# Patient Record
Sex: MALE | Race: WHITE | NOT HISPANIC OR LATINO | URBAN - NONMETROPOLITAN AREA
[De-identification: names, ages, dates, MRNs, and addresses within clinical notes are randomized per-mention and may not be internally consistent; named-entity substitution may affect disease eponyms.]

---

## 2023-08-25 ENCOUNTER — OFFICE VISIT (OUTPATIENT)
Dept: URBAN - NONMETROPOLITAN AREA CLINIC 2 | Facility: CLINIC | Age: 63
End: 2023-08-25

## 2023-08-25 RX ORDER — QUETIAPINE FUMARATE 25 MG/1
TABLET ORAL
Qty: 30 TABLET | Status: ACTIVE | COMMUNITY

## 2023-08-25 RX ORDER — LISINOPRIL 30 MG/1
TABLET ORAL
Qty: 100 TABLET | Status: ACTIVE | COMMUNITY

## 2023-08-25 RX ORDER — ALPRAZOLAM 0.25 MG/1
TABLET ORAL
Qty: 12 TABLET | Status: ACTIVE | COMMUNITY

## 2023-08-25 RX ORDER — ACETAMINOPHEN AND CODEINE PHOSPHATE 300; 60 MG/1; MG/1
TABLET ORAL
Qty: 60 TABLET | Status: ACTIVE | COMMUNITY

## 2023-08-25 RX ORDER — MESALAMINE 1.2 G/1
TABLET, DELAYED RELEASE ORAL
Qty: 0 | Refills: 0 | Status: ACTIVE | COMMUNITY
Start: 1900-01-01

## 2023-08-25 RX ORDER — MELOXICAM 15 MG/1
TABLET ORAL
Qty: 30 TABLET | Status: ACTIVE | COMMUNITY

## 2023-08-25 RX ORDER — PANTOPRAZOLE SODIUM 40 MG
TABLET, DELAYED RELEASE (ENTERIC COATED) ORAL
Qty: 0 | Refills: 0 | Status: ACTIVE | COMMUNITY
Start: 1900-01-01

## 2023-10-04 ENCOUNTER — OFFICE VISIT (OUTPATIENT)
Dept: URBAN - NONMETROPOLITAN AREA CLINIC 2 | Facility: CLINIC | Age: 63
End: 2023-10-04

## 2024-06-12 ENCOUNTER — OFFICE VISIT (OUTPATIENT)
Dept: URBAN - NONMETROPOLITAN AREA CLINIC 2 | Facility: CLINIC | Age: 64
End: 2024-06-12

## 2024-06-12 PROBLEM — 41364008: Status: ACTIVE | Noted: 2024-06-12

## 2024-06-12 PROBLEM — 1082771000119100: Status: ACTIVE | Noted: 2024-06-12

## 2024-06-12 RX ORDER — LISINOPRIL 30 MG/1
TABLET ORAL
Qty: 100 TABLET | Status: ACTIVE | COMMUNITY

## 2024-06-12 RX ORDER — QUETIAPINE FUMARATE 25 MG/1
TABLET ORAL
Qty: 30 TABLET | Status: ACTIVE | COMMUNITY

## 2024-06-12 RX ORDER — PANTOPRAZOLE SODIUM 40 MG
TABLET, DELAYED RELEASE (ENTERIC COATED) ORAL
Qty: 0 | Refills: 0 | Status: ACTIVE | COMMUNITY
Start: 1900-01-01

## 2024-06-12 RX ORDER — ALPRAZOLAM 0.25 MG/1
TABLET ORAL
Qty: 12 TABLET | Status: ACTIVE | COMMUNITY

## 2024-06-12 RX ORDER — MELOXICAM 15 MG/1
TABLET ORAL
Qty: 30 TABLET | Status: ACTIVE | COMMUNITY

## 2024-06-12 RX ORDER — ACETAMINOPHEN AND CODEINE PHOSPHATE 300; 60 MG/1; MG/1
TABLET ORAL
Qty: 60 TABLET | Status: ACTIVE | COMMUNITY

## 2024-06-12 RX ORDER — MESALAMINE 1.2 G/1
TABLET, DELAYED RELEASE ORAL
Qty: 0 | Refills: 0 | Status: ACTIVE | COMMUNITY
Start: 1900-01-01

## 2024-06-12 NOTE — HPI-TODAY'S VISIT:
63 y/o M with history of left sided ulcerative colitis and Bagley's esophagus with low grade dysplasia presents to re-establish care.  He last had EGD/colonoscopy with our group 10/2019 with long segment Bagley's and active proctosigmoiditis.  He had a Bagley's ablation a month later with Dr. Harrington after his biopsies showed low grade dysplasia.  Today he reports that

## 2024-06-20 ENCOUNTER — OFFICE VISIT (OUTPATIENT)
Dept: URBAN - NONMETROPOLITAN AREA CLINIC 2 | Facility: CLINIC | Age: 64
End: 2024-06-20

## 2024-06-20 RX ORDER — ALPRAZOLAM 0.25 MG/1
TABLET ORAL
Qty: 12 TABLET | Status: ACTIVE | COMMUNITY

## 2024-06-20 RX ORDER — MELOXICAM 15 MG/1
TABLET ORAL
Qty: 30 TABLET | Status: ACTIVE | COMMUNITY

## 2024-06-20 RX ORDER — MESALAMINE 1.2 G/1
TABLET, DELAYED RELEASE ORAL
Qty: 0 | Refills: 0 | Status: ACTIVE | COMMUNITY
Start: 1900-01-01

## 2024-06-20 RX ORDER — LISINOPRIL 30 MG/1
TABLET ORAL
Qty: 100 TABLET | Status: ACTIVE | COMMUNITY

## 2024-06-20 RX ORDER — PANTOPRAZOLE SODIUM 40 MG
TABLET, DELAYED RELEASE (ENTERIC COATED) ORAL
Qty: 0 | Refills: 0 | Status: ACTIVE | COMMUNITY
Start: 1900-01-01

## 2024-06-20 RX ORDER — QUETIAPINE FUMARATE 25 MG/1
TABLET ORAL
Qty: 30 TABLET | Status: ACTIVE | COMMUNITY

## 2024-06-20 RX ORDER — ACETAMINOPHEN AND CODEINE PHOSPHATE 300; 60 MG/1; MG/1
TABLET ORAL
Qty: 60 TABLET | Status: ACTIVE | COMMUNITY

## 2024-06-25 ENCOUNTER — LAB OUTSIDE AN ENCOUNTER (OUTPATIENT)
Dept: URBAN - NONMETROPOLITAN AREA CLINIC 2 | Facility: CLINIC | Age: 64
End: 2024-06-25

## 2024-06-25 ENCOUNTER — OFFICE VISIT (OUTPATIENT)
Dept: URBAN - NONMETROPOLITAN AREA CLINIC 2 | Facility: CLINIC | Age: 64
End: 2024-06-25
Payer: MEDICARE

## 2024-06-25 ENCOUNTER — DASHBOARD ENCOUNTERS (OUTPATIENT)
Age: 64
End: 2024-06-25

## 2024-06-25 VITALS
SYSTOLIC BLOOD PRESSURE: 126 MMHG | HEART RATE: 92 BPM | DIASTOLIC BLOOD PRESSURE: 92 MMHG | WEIGHT: 221 LBS | BODY MASS INDEX: 31.64 KG/M2 | TEMPERATURE: 92 F | HEIGHT: 70 IN

## 2024-06-25 DIAGNOSIS — R13.19 CERVICAL DYSPHAGIA: ICD-10-CM

## 2024-06-25 DIAGNOSIS — K51.80 CHRONIC PANCOLONIC ULCERATIVE COLITIS: ICD-10-CM

## 2024-06-25 DIAGNOSIS — K22.710 BARRETT'S ESOPHAGUS WITH LOW GRADE DYSPLASIA: ICD-10-CM

## 2024-06-25 DIAGNOSIS — R10.32 LLQ PAIN: ICD-10-CM

## 2024-06-25 PROBLEM — 14760008: Status: ACTIVE | Noted: 2024-06-25

## 2024-06-25 PROCEDURE — 99204 OFFICE O/P NEW MOD 45 MIN: CPT

## 2024-06-25 RX ORDER — PANTOPRAZOLE SODIUM 40 MG
TABLET, DELAYED RELEASE (ENTERIC COATED) ORAL
Qty: 0 | Refills: 0 | Status: ACTIVE | COMMUNITY
Start: 1900-01-01

## 2024-06-25 RX ORDER — MESALAMINE 1.2 G/1
TABLET, DELAYED RELEASE ORAL
Qty: 0 | Refills: 0 | Status: ACTIVE | COMMUNITY
Start: 1900-01-01

## 2024-06-25 RX ORDER — HYDROCORTISONE ACETATE 25 MG/1
1 SUPPOSITORY SUPPOSITORY RECTAL ONCE A DAY
Qty: 30 UNSPECIFIED | Refills: 3 | OUTPATIENT
Start: 2024-06-25 | End: 2024-10-23

## 2024-06-25 RX ORDER — ALPRAZOLAM 0.25 MG/1
TABLET ORAL
Qty: 12 TABLET | Status: ACTIVE | COMMUNITY

## 2024-06-25 RX ORDER — MELOXICAM 15 MG/1
TABLET ORAL
Qty: 30 TABLET | Status: ACTIVE | COMMUNITY

## 2024-06-25 RX ORDER — ACETAMINOPHEN AND CODEINE PHOSPHATE 300; 60 MG/1; MG/1
TABLET ORAL
Qty: 60 TABLET | Status: ACTIVE | COMMUNITY

## 2024-06-25 RX ORDER — LISINOPRIL 30 MG/1
TABLET ORAL
Qty: 100 TABLET | Status: ACTIVE | COMMUNITY

## 2024-06-25 RX ORDER — QUETIAPINE FUMARATE 25 MG/1
TABLET ORAL
Qty: 30 TABLET | Status: ACTIVE | COMMUNITY

## 2024-06-25 RX ORDER — DICYCLOMINE HYDROCHLORIDE 20 MG/1
1 TABLET TABLET ORAL THREE TIMES A DAY
Qty: 270 TABLET | Refills: 3 | OUTPATIENT
Start: 2024-06-25 | End: 2025-06-20

## 2024-06-25 RX ORDER — PANTOPRAZOLE 40 MG/1
TAKE 1 TABLET BY MOUTH ONCE DAILY TABLET, DELAYED RELEASE ORAL
Qty: 90 EACH | Refills: 1 | Status: ACTIVE | COMMUNITY

## 2024-06-25 NOTE — HPI-TODAY'S VISIT:
65 y/o M with history of left sided ulcerative colitis and Bagley's esophagus with low grade dysplasia presents to re-establish care.  He last had EGD/colonoscopy with our group 10/2019 with long segment Bagley's and active proctosigmoiditis.  He had a Bagley's ablation a month later with Dr. Harrington after his biopsies showed low grade dysplasia.   6/25/2024 Domingo presents to clinic for evaluation of rectal bleeding lower abdominal pain and frequent bowel movements.  He also has a history of Bagley's esophagus with ablation in the past with Dr. Harrington. Today he complains of ongoing bloody stool which occurs every day for the first hour of the day after he wakes up.  He has been off Lialda he states this has been ongoing for years.  For some time.  He denies any reflux symptoms.  He continues on meloxicam and pantoprazole 40 mg once a day.  He no longer has sleep apnea states this resolved after significant weight loss.  He lost 40 pounds over the last year and a half unintentionally. He is having 6-10 trips to the bathroom per day left lower abdomen cramping discomfort and a sense of rectal pressure. He is also having some rectal leakage. This has been ongoing for years. He endorses some dysphagia due to difficulty chewing well  with his upper denture. He refuses to use Uceris rectal foam but will consider suppositories. He would like to schedule his repeat surveillance EGD and colonoscopy.  However today he is experiencing chest pain and is overdue for his follow-up with cardiology with a history of CAD with PCI.  He believes he is due for a 5-year stress test.  He has not yet established with a cardiologist in this area.  He will call [facility] to seek an appointment and we will send a clearance request.

## 2024-06-26 ENCOUNTER — TELEPHONE ENCOUNTER (OUTPATIENT)
Dept: URBAN - NONMETROPOLITAN AREA CLINIC 2 | Facility: CLINIC | Age: 64
End: 2024-06-26

## 2024-06-26 LAB
A/G RATIO: 1.5
ABSOLUTE BASOPHILS: 63
ABSOLUTE EOSINOPHILS: 7
ABSOLUTE LYMPHOCYTES: 1211
ABSOLUTE MONOCYTES: 518
ABSOLUTE NEUTROPHILS: 5201
ALBUMIN: 4.1
ALKALINE PHOSPHATASE: 83
ALT (SGPT): 6
AST (SGOT): 12
BASOPHILS: 0.9
BILIRUBIN, TOTAL: 0.4
BUN/CREATININE RATIO: (no result)
BUN: 19
C-REACTIVE PROTEIN, QUANT: <3
CALCIUM: 9.3
CARBON DIOXIDE, TOTAL: 27
CHLORIDE: 106
CREATININE: 1.34
EGFR: 58
EOSINOPHILS: 0.1
GLOBULIN, TOTAL: 2.8
GLUCOSE: 89
HEMATOCRIT: 34.5
HEMOGLOBIN: 10.2
LYMPHOCYTES: 17.3
MCH: 23.9
MCHC: 29.6
MCV: 80.8
MONOCYTES: 7.4
MPV: 9.1
NEUTROPHILS: 74.3
PLATELET COUNT: 267
POTASSIUM: 4.9
PROTEIN, TOTAL: 6.9
RDW: 15
RED BLOOD CELL COUNT: 4.27
SED RATE BY MODIFIED: 6
SODIUM: 140
WHITE BLOOD CELL COUNT: 7

## 2024-06-28 ENCOUNTER — OFFICE VISIT (OUTPATIENT)
Dept: URBAN - NONMETROPOLITAN AREA CLINIC 2 | Facility: CLINIC | Age: 64
End: 2024-06-28

## 2024-07-16 ENCOUNTER — TELEPHONE ENCOUNTER (OUTPATIENT)
Dept: URBAN - NONMETROPOLITAN AREA CLINIC 2 | Facility: CLINIC | Age: 64
End: 2024-07-16

## 2024-08-20 ENCOUNTER — CLAIMS CREATED FROM THE CLAIM WINDOW (OUTPATIENT)
Dept: URBAN - METROPOLITAN AREA CLINIC 4 | Facility: CLINIC | Age: 64
End: 2024-08-20
Payer: COMMERCIAL

## 2024-08-20 ENCOUNTER — CLAIMS CREATED FROM THE CLAIM WINDOW (OUTPATIENT)
Dept: URBAN - NONMETROPOLITAN AREA SURGERY CENTER 1 | Facility: SURGERY CENTER | Age: 64
End: 2024-08-20
Payer: COMMERCIAL

## 2024-08-20 ENCOUNTER — TELEPHONE ENCOUNTER (OUTPATIENT)
Dept: URBAN - NONMETROPOLITAN AREA CLINIC 2 | Facility: CLINIC | Age: 64
End: 2024-08-20

## 2024-08-20 DIAGNOSIS — K22.719 BARRETT'S ESOPHAGUS WITH DYSPLASIA, UNSPECIFIED: ICD-10-CM

## 2024-08-20 DIAGNOSIS — K51.919 ULCERATIVE COLITIS, UNSPECIFIED WITH UNSPECIFIED COMPLICATIONS: ICD-10-CM

## 2024-08-20 DIAGNOSIS — K51.30 ACUTE ULCERATIVE RECTOSIGMOIDITIS: ICD-10-CM

## 2024-08-20 DIAGNOSIS — K31.89 OTHER DISEASES OF STOMACH AND DUODENUM: ICD-10-CM

## 2024-08-20 DIAGNOSIS — K22.70 BARRETT'S ESOPHAGUS WITHOUT DYSPLASIA: ICD-10-CM

## 2024-08-20 DIAGNOSIS — K25.7 CHRONIC GASTRIC ULCER WITHOUT HEMORRHAGE OR PERFORATION: ICD-10-CM

## 2024-08-20 DIAGNOSIS — K22.70 BARRETT ESOPHAGUS: ICD-10-CM

## 2024-08-20 DIAGNOSIS — K44.9 HIATAL HERNIA: ICD-10-CM

## 2024-08-20 DIAGNOSIS — K25.9 EROSION OF STOMACH DETERMINED BY ENDOSCOPY: ICD-10-CM

## 2024-08-20 DIAGNOSIS — Z98.84 HISTORY OF ROUX-EN-Y GASTRIC BYPASS: ICD-10-CM

## 2024-08-20 DIAGNOSIS — K51.80 ULCERATIVE COLITIS: ICD-10-CM

## 2024-08-20 PROBLEM — 8493009: Status: ACTIVE | Noted: 2024-08-20

## 2024-08-20 PROCEDURE — 00813 ANES UPR LWR GI NDSC PX: CPT | Performed by: NURSE ANESTHETIST, CERTIFIED REGISTERED

## 2024-08-20 PROCEDURE — 88312 SPECIAL STAINS GROUP 1: CPT | Performed by: PATHOLOGY

## 2024-08-20 PROCEDURE — 45380 COLONOSCOPY AND BIOPSY: CPT | Performed by: INTERNAL MEDICINE

## 2024-08-20 PROCEDURE — 43239 EGD BIOPSY SINGLE/MULTIPLE: CPT | Performed by: INTERNAL MEDICINE

## 2024-08-20 PROCEDURE — 88305 TISSUE EXAM BY PATHOLOGIST: CPT | Performed by: PATHOLOGY

## 2024-08-20 RX ORDER — ALPRAZOLAM 0.25 MG/1
TABLET ORAL
Qty: 12 TABLET | Status: ACTIVE | COMMUNITY

## 2024-08-20 RX ORDER — PANTOPRAZOLE 40 MG/1
TAKE 1 TABLET BY MOUTH ONCE DAILY TABLET, DELAYED RELEASE ORAL
Qty: 90 EACH | Refills: 1 | Status: ACTIVE | COMMUNITY

## 2024-08-20 RX ORDER — DICYCLOMINE HYDROCHLORIDE 20 MG/1
1 TABLET TABLET ORAL THREE TIMES A DAY
Qty: 270 TABLET | Refills: 3 | Status: ACTIVE | COMMUNITY
Start: 2024-06-25 | End: 2025-06-20

## 2024-08-20 RX ORDER — HYDROCORTISONE ACETATE 25 MG/1
1 SUPPOSITORY SUPPOSITORY RECTAL ONCE A DAY
Qty: 30 UNSPECIFIED | Refills: 3 | Status: ACTIVE | COMMUNITY
Start: 2024-06-25 | End: 2024-10-23

## 2024-08-20 RX ORDER — SUCRALFATE 1 G/1
1 TABLET ON AN EMPTY STOMACH TABLET ORAL TWICE A DAY
Qty: 60 | Refills: 5 | OUTPATIENT
Start: 2024-08-20 | End: 2025-02-15

## 2024-08-20 RX ORDER — QUETIAPINE FUMARATE 25 MG/1
TABLET ORAL
Qty: 30 TABLET | Status: ACTIVE | COMMUNITY

## 2024-08-20 RX ORDER — MELOXICAM 15 MG/1
TABLET ORAL
Qty: 30 TABLET | Status: ACTIVE | COMMUNITY

## 2024-08-20 RX ORDER — ACETAMINOPHEN AND CODEINE PHOSPHATE 300; 60 MG/1; MG/1
TABLET ORAL
Qty: 60 TABLET | Status: ACTIVE | COMMUNITY

## 2024-08-20 RX ORDER — MESALAMINE 1.2 G/1
TABLET, DELAYED RELEASE ORAL
Qty: 0 | Refills: 0 | Status: ACTIVE | COMMUNITY
Start: 1900-01-01

## 2024-08-20 RX ORDER — LISINOPRIL 30 MG/1
TABLET ORAL
Qty: 100 TABLET | Status: ACTIVE | COMMUNITY

## 2024-08-20 RX ORDER — PANTOPRAZOLE SODIUM 40 MG
TABLET, DELAYED RELEASE (ENTERIC COATED) ORAL
Qty: 0 | Refills: 0 | Status: ACTIVE | COMMUNITY
Start: 1900-01-01

## 2024-09-23 ENCOUNTER — TELEPHONE ENCOUNTER (OUTPATIENT)
Dept: URBAN - NONMETROPOLITAN AREA CLINIC 2 | Facility: CLINIC | Age: 64
End: 2024-09-23

## 2024-09-23 RX ORDER — SUCRALFATE 1 G/1
1 TABLET ON AN EMPTY STOMACH TABLET ORAL TWICE A DAY
Qty: 60 | Refills: 5
Start: 2024-08-20 | End: 2025-03-22

## 2024-09-23 RX ORDER — DICYCLOMINE HYDROCHLORIDE 20 MG/1
1 TABLET TABLET ORAL THREE TIMES A DAY
Qty: 270 TABLET | Refills: 3
Start: 2024-06-25 | End: 2025-09-18

## 2024-09-27 ENCOUNTER — OFFICE VISIT (OUTPATIENT)
Dept: URBAN - NONMETROPOLITAN AREA CLINIC 2 | Facility: CLINIC | Age: 64
End: 2024-09-27

## 2024-10-23 ENCOUNTER — OFFICE VISIT (OUTPATIENT)
Dept: URBAN - NONMETROPOLITAN AREA CLINIC 2 | Facility: CLINIC | Age: 64
End: 2024-10-23
Payer: COMMERCIAL

## 2024-10-23 VITALS
HEIGHT: 70 IN | DIASTOLIC BLOOD PRESSURE: 89 MMHG | HEART RATE: 83 BPM | WEIGHT: 213.4 LBS | BODY MASS INDEX: 30.55 KG/M2 | SYSTOLIC BLOOD PRESSURE: 145 MMHG

## 2024-10-23 DIAGNOSIS — R19.8 RECTAL TENESMUS: ICD-10-CM

## 2024-10-23 DIAGNOSIS — K22.70 BARRETT'S ESOPHAGUS: ICD-10-CM

## 2024-10-23 DIAGNOSIS — R10.32 LLQ PAIN: ICD-10-CM

## 2024-10-23 DIAGNOSIS — K27.9 PUD (PEPTIC ULCER DISEASE): ICD-10-CM

## 2024-10-23 DIAGNOSIS — R13.19 DYSPHAGIA: ICD-10-CM

## 2024-10-23 DIAGNOSIS — K59.09 CHRONIC CONSTIPATION: ICD-10-CM

## 2024-10-23 DIAGNOSIS — D84.9 IMMUNOSUPPRESSED STATUS: ICD-10-CM

## 2024-10-23 DIAGNOSIS — K62.5 RECTAL BLEEDING: ICD-10-CM

## 2024-10-23 DIAGNOSIS — R11.0 CHRONIC NAUSEA: ICD-10-CM

## 2024-10-23 DIAGNOSIS — Z86.79 HISTORY OF CAD (CORONARY ARTERY DISEASE): ICD-10-CM

## 2024-10-23 DIAGNOSIS — K51.90 ACUTE ULCERATIVE COLITIS: ICD-10-CM

## 2024-10-23 DIAGNOSIS — R19.5 LOOSE STOOLS: ICD-10-CM

## 2024-10-23 PROBLEM — 38013005: Status: ACTIVE | Noted: 2024-10-23

## 2024-10-23 PROBLEM — 13200003: Status: ACTIVE | Noted: 2024-10-23

## 2024-10-23 PROCEDURE — 99215 OFFICE O/P EST HI 40 MIN: CPT | Performed by: NURSE PRACTITIONER

## 2024-10-23 RX ORDER — DICYCLOMINE HYDROCHLORIDE 20 MG/1
1 TABLET TABLET ORAL THREE TIMES A DAY
Qty: 270 TABLET | Refills: 3 | Status: ACTIVE | COMMUNITY
Start: 2024-06-25 | End: 2025-09-18

## 2024-10-23 RX ORDER — ACETAMINOPHEN AND CODEINE PHOSPHATE 300; 60 MG/1; MG/1
TABLET ORAL
Qty: 60 TABLET | Status: ACTIVE | COMMUNITY

## 2024-10-23 RX ORDER — DICYCLOMINE HYDROCHLORIDE 20 MG/1
1 TABLET TABLET ORAL THREE TIMES A DAY
Qty: 270 TABLET | Refills: 3 | OUTPATIENT

## 2024-10-23 RX ORDER — ADALIMUMAB 80MG/0.8ML
STARTER KIT. 160MG DAY 1 AND 80MG DAY 14 KIT SUBCUTANEOUS EVERY 2 WEEKS
Qty: 3 PRE-FILLED PEN SYRINGE | Refills: 0 | OUTPATIENT
Start: 2024-10-23 | End: 2024-11-19

## 2024-10-23 RX ORDER — SUCRALFATE 1 G/1
1 TABLET ON AN EMPTY STOMACH TABLET ORAL TWICE A DAY
Qty: 60 | Refills: 5 | Status: ACTIVE | COMMUNITY
Start: 2024-08-20 | End: 2025-03-22

## 2024-10-23 RX ORDER — ADALIMUMAB 40MG/0.4ML
40MG EVERY 2 WEEKS KIT SUBCUTANEOUS EVERY 2 WEEKS
Qty: 2 | Refills: 11 | OUTPATIENT
Start: 2024-10-23 | End: 2025-09-24

## 2024-10-23 RX ORDER — LISINOPRIL 30 MG/1
TABLET ORAL
Qty: 100 TABLET | Status: ACTIVE | COMMUNITY

## 2024-10-23 RX ORDER — ALPRAZOLAM 0.25 MG/1
TABLET ORAL
Qty: 12 TABLET | Status: ACTIVE | COMMUNITY

## 2024-10-23 RX ORDER — MESALAMINE 1.2 G/1
TABLET, DELAYED RELEASE ORAL
Qty: 0 | Refills: 0 | Status: ACTIVE | COMMUNITY
Start: 1900-01-01

## 2024-10-23 RX ORDER — QUETIAPINE FUMARATE 25 MG/1
TABLET ORAL
Qty: 30 TABLET | Status: ACTIVE | COMMUNITY

## 2024-10-23 RX ORDER — PANTOPRAZOLE SODIUM 40 MG
TABLET, DELAYED RELEASE (ENTERIC COATED) ORAL
Qty: 0 | Refills: 0 | Status: ACTIVE | COMMUNITY
Start: 1900-01-01

## 2024-10-23 RX ORDER — MELOXICAM 15 MG/1
TABLET ORAL
Qty: 30 TABLET | Status: ACTIVE | COMMUNITY

## 2024-10-23 RX ORDER — PANTOPRAZOLE SODIUM 40 MG/1
1 TABLET TABLET, DELAYED RELEASE ORAL ONCE A DAY
Qty: 90 TABLET | Refills: 3 | OUTPATIENT
Start: 2024-10-23

## 2024-10-23 RX ORDER — PANTOPRAZOLE 40 MG/1
TAKE 1 TABLET BY MOUTH ONCE DAILY TABLET, DELAYED RELEASE ORAL
Qty: 90 EACH | Refills: 1 | Status: ACTIVE | COMMUNITY

## 2024-10-23 RX ORDER — HYDROCORTISONE ACETATE 25 MG/1
1 SUPPOSITORY SUPPOSITORY RECTAL ONCE A DAY
Qty: 30 UNSPECIFIED | Refills: 3 | Status: ACTIVE | COMMUNITY
Start: 2024-06-25 | End: 2024-10-23

## 2024-10-23 RX ORDER — ONDANSETRON 4 MG/1
1 TABLET ON THE TONGUE AND ALLOW TO DISSOLVE TABLET, ORALLY DISINTEGRATING ORAL
Qty: 30 TABLET | Refills: 0 | OUTPATIENT
Start: 2024-10-23

## 2024-10-23 NOTE — HPI-TODAY'S VISIT:
Mr. Domingo West is a 64-year-old male who presents today for follow-up of panendoscopy.  EGD was performed showing Bagley's esophagus and a healed focal erosion.  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  Today Domingo endorses constant nausea though the indigestion and upper abdominal pain he was experiencing at his last office visit resolved following EGD.  He denies any melena or hematemesis.  He does endorse persistent bright red blood when wiping.  He has not taken Liotta since July of this year.  He admits that it is hard for him to take pills on a daily basis and would prefer an infusion or injection to manage his UC.  He is also struggling with some constipation recently, having a bowel movement every 3 to 4 days followed by diarrhea.  We discussed the possibility of overlapping IBS-C and may start him on lactulose versus Amitiza at follow-up pending his improvement with Humira.,  He continues to use dicyclomine 20 mg twice daily for abdominal discomfort and we may also look this to Levsin.  For now, plan to start Humira and continue pantoprazole.  LG.

## 2024-10-23 NOTE — HPI-OTHER HISTORIES
65 y/o M with history of left sided ulcerative colitis and Balgey's esophagus with low grade dysplasia presents to re-establish care. He last had EGD/colonoscopy with our group 10/2019 with long segment Bagley's and active proctosigmoiditis. He had a Bagley's ablation a month later with Dr. Harrington after his biopsies showed low grade dysplasia.   6/25/2024 Domingo presents to clinic for evaluation of rectal bleeding lower abdominal pain and frequent bowel movements. He also has a history of Bagley's esophagus with ablation in the past with Dr. Harrington. Today he complains of ongoing bloody stool which occurs every day for the first hour of the day after he wakes up. He has been off Lialda he states this has been ongoing for years. For some time. He denies any reflux symptoms. He continues on meloxicam and pantoprazole 40 mg once a day. He no longer has sleep apnea states this resolved after significant weight loss. He lost 40 pounds over the last year and a half unintentionally. He is having 6-10 trips to the bathroom per day left lower abdomen cramping discomfort and a sense of rectal pressure. He is also having some rectal leakage. This has been ongoing for years. He endorses some dysphagia due to difficulty chewing well with his upper denture. He refuses to use Uceris rectal foam but will consider suppositories. He would like to schedule his repeat surveillance EGD and colonoscopy. However today he is experiencing chest pain and is overdue for his follow-up with cardiology with a history of CAD with PCI. He believes he is due for a 5-year stress test. He has not yet established with a cardiologist in this area. He will call [facility] to seek an appointment and we will send a clearance request.

## 2024-10-24 ENCOUNTER — P2P PATIENT RECORD (OUTPATIENT)
Age: 64
End: 2024-10-24

## 2024-10-24 ENCOUNTER — TELEPHONE ENCOUNTER (OUTPATIENT)
Dept: URBAN - NONMETROPOLITAN AREA CLINIC 13 | Facility: CLINIC | Age: 64
End: 2024-10-24

## 2024-10-25 ENCOUNTER — TELEPHONE ENCOUNTER (OUTPATIENT)
Dept: URBAN - NONMETROPOLITAN AREA CLINIC 13 | Facility: CLINIC | Age: 64
End: 2024-10-25

## 2024-10-26 LAB
A/G RATIO: 1.6
ABSOLUTE BASOPHILS: 60
ABSOLUTE EOSINOPHILS: 0
ABSOLUTE LYMPHOCYTES: 1548
ABSOLUTE MONOCYTES: 722
ABSOLUTE NEUTROPHILS: 6269
ALBUMIN: 4.2
ALKALINE PHOSPHATASE: 78
ALT (SGPT): 6
AST (SGOT): 13
BASOPHILS: 0.7
BILIRUBIN, TOTAL: 0.4
BUN/CREATININE RATIO: 15
BUN: 22
CALCIUM: 9.2
CARBON DIOXIDE, TOTAL: 28
CHLORIDE: 101
CREATININE: 1.43
EGFR: 54
EOSINOPHILS: 0
GLOBULIN, TOTAL: 2.6
GLUCOSE: 81
HBSAG SCREEN: (no result)
HEMATOCRIT: 35
HEMOGLOBIN: 10.3
HEP A AB, IGM: (no result)
HEP B CORE AB, IGM: (no result)
HEPATITIS C ANTIBODY: (no result)
INR: 1
LYMPHOCYTES: 18
MCH: 24.1
MCHC: 29.4
MCV: 82
MITOGEN-NIL: 6.38
MONOCYTES: 8.4
MPV: 10
NEUTROPHILS: 72.9
PLATELET COUNT: 300
POTASSIUM: 4.8
PROTEIN, TOTAL: 6.8
PT: 10.8
QUANTIFERON NIL VALUE: 0.05
QUANTIFERON TB1 AG VALUE: 0
QUANTIFERON TB2 AG VALUE: <0
QUANTIFERON-TB GOLD PLUS: NEGATIVE
RDW: 15.7
RED BLOOD CELL COUNT: 4.27
SODIUM: 138
WHITE BLOOD CELL COUNT: 8.6

## 2024-11-18 ENCOUNTER — OFFICE VISIT (OUTPATIENT)
Dept: URBAN - NONMETROPOLITAN AREA CLINIC 2 | Facility: CLINIC | Age: 64
End: 2024-11-18

## 2024-11-25 ENCOUNTER — ERX REFILL RESPONSE (OUTPATIENT)
Dept: URBAN - NONMETROPOLITAN AREA CLINIC 2 | Facility: CLINIC | Age: 64
End: 2024-11-25

## 2024-11-25 RX ORDER — SUCRALFATE 1 G/1
1 TABLET ON AN EMPTY STOMACH TABLET ORAL TWICE A DAY
Qty: 60 | Refills: 5 | OUTPATIENT

## 2024-11-25 RX ORDER — SUCRALFATE 1 G/1
TAKE 1 TABLET BY MOUTH ON AN  EMPTY STOMACH TWICE DAILY TABLET ORAL
Qty: 200 TABLET | Refills: 2 | OUTPATIENT

## 2025-01-22 ENCOUNTER — OFFICE VISIT (OUTPATIENT)
Dept: URBAN - NONMETROPOLITAN AREA CLINIC 2 | Facility: CLINIC | Age: 65
End: 2025-01-22

## 2025-02-17 ENCOUNTER — P2P PATIENT RECORD (OUTPATIENT)
Age: 65
End: 2025-02-17

## 2025-04-04 ENCOUNTER — TELEPHONE ENCOUNTER (OUTPATIENT)
Dept: URBAN - NONMETROPOLITAN AREA CLINIC 2 | Facility: CLINIC | Age: 65
End: 2025-04-04

## 2025-04-18 ENCOUNTER — OFFICE VISIT (OUTPATIENT)
Dept: URBAN - NONMETROPOLITAN AREA CLINIC 2 | Facility: CLINIC | Age: 65
End: 2025-04-18

## 2025-05-20 ENCOUNTER — OFFICE VISIT (OUTPATIENT)
Dept: URBAN - NONMETROPOLITAN AREA CLINIC 2 | Facility: CLINIC | Age: 65
End: 2025-05-20
Payer: COMMERCIAL

## 2025-05-20 DIAGNOSIS — R63.4 WEIGHT LOSS: ICD-10-CM

## 2025-05-20 DIAGNOSIS — D64.9 ANEMIA, UNSPECIFIED TYPE: ICD-10-CM

## 2025-05-20 DIAGNOSIS — Z86.79 HISTORY OF CAD (CORONARY ARTERY DISEASE): ICD-10-CM

## 2025-05-20 DIAGNOSIS — K22.70 BARRETT'S ESOPHAGUS: ICD-10-CM

## 2025-05-20 DIAGNOSIS — R13.10 DYSPHAGIA: ICD-10-CM

## 2025-05-20 DIAGNOSIS — R19.5 LOOSE STOOLS: ICD-10-CM

## 2025-05-20 DIAGNOSIS — R19.8 RECTAL TENESMUS: ICD-10-CM

## 2025-05-20 DIAGNOSIS — K62.5 RECTAL BLEEDING: ICD-10-CM

## 2025-05-20 DIAGNOSIS — R11.0 CHRONIC NAUSEA: ICD-10-CM

## 2025-05-20 DIAGNOSIS — K59.09 CHRONIC CONSTIPATION: ICD-10-CM

## 2025-05-20 DIAGNOSIS — K27.9 PUD (PEPTIC ULCER DISEASE): ICD-10-CM

## 2025-05-20 DIAGNOSIS — D84.9 IMMUNOSUPPRESSED STATUS: ICD-10-CM

## 2025-05-20 DIAGNOSIS — R10.32 LLQ PAIN: ICD-10-CM

## 2025-05-20 DIAGNOSIS — K51.90 ULCERATIVE COLITIS: ICD-10-CM

## 2025-05-20 PROBLEM — 422587007: Status: ACTIVE | Noted: 2025-05-20

## 2025-05-20 PROBLEM — 12063002: Status: ACTIVE | Noted: 2025-05-20

## 2025-05-20 PROBLEM — 301716002: Status: ACTIVE | Noted: 2025-05-20

## 2025-05-20 PROBLEM — 271737000: Status: ACTIVE | Noted: 2025-05-20

## 2025-05-20 PROBLEM — 236069009: Status: ACTIVE | Noted: 2025-05-20

## 2025-05-20 PROBLEM — 398032003: Status: ACTIVE | Noted: 2025-05-20

## 2025-05-20 PROBLEM — 275544003: Status: ACTIVE | Noted: 2025-05-20

## 2025-05-20 PROCEDURE — 99214 OFFICE O/P EST MOD 30 MIN: CPT

## 2025-05-20 RX ORDER — ADALIMUMAB 80MG/0.8ML
STARTER KIT. 160MG DAY 1 AND 80MG DAY 14 KIT SUBCUTANEOUS EVERY 2 WEEKS
Qty: 3 PRE-FILLED PEN SYRINGE | Refills: 0 | OUTPATIENT
Start: 2025-05-20 | End: 2025-06-17

## 2025-05-20 RX ORDER — ADALIMUMAB 40MG/0.4ML
40MG EVERY 2 WEEKS KIT SUBCUTANEOUS EVERY 2 WEEKS
Qty: 2 | Refills: 11 | Status: ACTIVE | COMMUNITY
Start: 2024-10-23 | End: 2025-09-24

## 2025-05-20 RX ORDER — PANTOPRAZOLE SODIUM 40 MG
TABLET, DELAYED RELEASE (ENTERIC COATED) ORAL
Qty: 0 | Refills: 0 | Status: ACTIVE | COMMUNITY
Start: 1900-01-01

## 2025-05-20 RX ORDER — BUDESONIDE 3 MG/1
3 CAPSULES CAPSULE, DELAYED RELEASE PELLETS ORAL ONCE A DAY
Qty: 168 CAPSULE | Refills: 0 | OUTPATIENT
Start: 2025-05-20

## 2025-05-20 RX ORDER — ONDANSETRON 4 MG/1
1 TABLET ON THE TONGUE AND ALLOW TO DISSOLVE TABLET, ORALLY DISINTEGRATING ORAL
Qty: 30 TABLET | Refills: 0 | Status: ACTIVE | COMMUNITY
Start: 2024-10-23

## 2025-05-20 RX ORDER — QUETIAPINE FUMARATE 25 MG/1
TABLET ORAL
Qty: 30 TABLET | Status: ACTIVE | COMMUNITY

## 2025-05-20 RX ORDER — PANTOPRAZOLE SODIUM 40 MG/1
1 TABLET TABLET, DELAYED RELEASE ORAL TWICE A DAY
Qty: 180 TABLET | Refills: 3 | OUTPATIENT
Start: 2025-05-20

## 2025-05-20 RX ORDER — ADALIMUMAB 40MG/0.4ML
AS DIRECTED START DAY 28 KIT SUBCUTANEOUS EVERY OTHER WEEK
Qty: 2 PRE-FILLED PEN SYRINGE | Refills: 6 | OUTPATIENT
Start: 2025-05-20 | End: 2025-12-02

## 2025-05-20 RX ORDER — DICYCLOMINE HYDROCHLORIDE 20 MG/1
1 TABLET TABLET ORAL THREE TIMES A DAY
Qty: 270 TABLET | Refills: 3 | Status: ACTIVE | COMMUNITY

## 2025-05-20 RX ORDER — LISINOPRIL 30 MG/1
TABLET ORAL
Qty: 100 TABLET | Status: ACTIVE | COMMUNITY

## 2025-05-20 RX ORDER — PANTOPRAZOLE 40 MG/1
TAKE 1 TABLET BY MOUTH ONCE DAILY TABLET, DELAYED RELEASE ORAL
Qty: 90 EACH | Refills: 1 | Status: ACTIVE | COMMUNITY

## 2025-05-20 RX ORDER — ACETAMINOPHEN AND CODEINE PHOSPHATE 300; 60 MG/1; MG/1
TABLET ORAL
Qty: 60 TABLET | Status: ACTIVE | COMMUNITY

## 2025-05-20 RX ORDER — MESALAMINE 1.2 G/1
TABLET, DELAYED RELEASE ORAL
Qty: 0 | Refills: 0 | Status: ACTIVE | COMMUNITY
Start: 1900-01-01

## 2025-05-20 RX ORDER — MELOXICAM 15 MG/1
TABLET ORAL
Qty: 30 TABLET | Status: ACTIVE | COMMUNITY

## 2025-05-20 RX ORDER — PANTOPRAZOLE SODIUM 40 MG/1
1 TABLET TABLET, DELAYED RELEASE ORAL ONCE A DAY
Qty: 90 TABLET | Refills: 3 | Status: ACTIVE | COMMUNITY
Start: 2024-10-23

## 2025-05-20 RX ORDER — SUCRALFATE 1 G/1
TAKE 1 TABLET BY MOUTH ON AN  EMPTY STOMACH TWICE DAILY TABLET ORAL
Qty: 200 TABLET | Refills: 2 | Status: ACTIVE | COMMUNITY

## 2025-05-20 NOTE — HPI-OTHER HISTORIES
65 y/o M with history of left sided ulcerative colitis and Bagley's esophagus with low grade dysplasia presents to re-establish care. He last had EGD/colonoscopy with our group 10/2019 with long segment Bagley's and active proctosigmoiditis. He had a Bagley's ablation a month later with Dr. Harrington after his biopsies showed low grade dysplasia.   6/25/2024 Domingo presents to clinic for evaluation of rectal bleeding lower abdominal pain and frequent bowel movements. He also has a history of Bagley's esophagus with ablation in the past with Dr. Harrington. Today he complains of ongoing bloody stool which occurs every day for the first hour of the day after he wakes up. He has been off Lialda he states this has been ongoing for years. For some time. He denies any reflux symptoms. He continues on meloxicam and pantoprazole 40 mg once a day. He no longer has sleep apnea states this resolved after significant weight loss. He lost 40 pounds over the last year and a half unintentionally. He is having 6-10 trips to the bathroom per day left lower abdomen cramping discomfort and a sense of rectal pressure. He is also having some rectal leakage. This has been ongoing for years. He endorses some dysphagia due to difficulty chewing well with his upper denture. He refuses to use Uceris rectal foam but will consider suppositories. He would like to schedule his repeat surveillance EGD and colonoscopy. However today he is experiencing chest pain and is overdue for his follow-up with cardiology with a history of CAD with PCI. He believes he is due for a 5-year stress test. He has not yet established with a cardiologist in this area. He will call [facility] to seek an appointment and we will send a clearance request.  10/23/2024: Mr. Domingo West is a 64-year-old male who presents today for follow-up of panendoscopy. EGD was performed showing Bagley's esophagus and a healed focal erosion. 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 Today Domingo endorses constant nausea though the indigestion and upper abdominal pain he was experiencing at his last office visit resolved following EGD. He denies any melena or hematemesis. He does endorse persistent bright red blood when wiping. He has not taken Liotta since July of this year. He admits that it is hard for him to take pills on a daily basis and would prefer an infusion or injection to manage his UC. He is also struggling with some constipation recently, having a bowel movement every 3 to 4 days followed by diarrhea. We discussed the possibility of overlapping IBS-C and may start him on lactulose versus Amitiza at follow-up pending his improvement with Humira., He continues to use dicyclomine 20 mg twice daily for abdominal discomfort and we may also look this to Levsin. For now, plan to start Humira and continue pantoprazole. LG.  5/20/2025: Domingo presents today for an ulcerative colitis follow-up. Since his last visit, he did not start his Humira induction injections. He states that he is having bowel movements every other day currently. He does report that he alternates between constipation and diarrhea every few months. He is noticing bright red blood and mucus in most of his bowel movements. He does also admit to a 30 pound weight loss in the last year. He also takes dicyclomine 20 mg 3 times daily for abdominal cramping and reports that it works well. His last colonoscopy was 8/2024 that revealed moderate inflammation in the rectum with biopsies consistent with ulcerative colitis. He is due for repeat in 2027. Upon reviewing his last visit labs, he had a hemoglobin of 10.3. He denies hematemesis, melena. He does have a history of Bagley's esophagus. His last EGD was 8/2024 that revealed 5 cm segment of focal and intestinal metaplasia without dysplasia. He is due for repeat in 2026. He is currently taking pantoprazole 40 mg once a day. He denies dysphagia, heartburn, hematemesis. Will repeat labs today to check his anemia. If still low, refer to heme. Anemia likely secondary to uncontrolled UC. Will also restart Humira injection induction. Will also increase pantoprazole 40 mg dose to twice a day to cover for Bagley's esophagus. Close follow-up in 4 to 6 weeks. WILLIAM

## 2025-05-21 ENCOUNTER — TELEPHONE ENCOUNTER (OUTPATIENT)
Dept: URBAN - NONMETROPOLITAN AREA CLINIC 2 | Facility: CLINIC | Age: 65
End: 2025-05-21

## 2025-05-21 LAB
A/G RATIO: 1.7
ABSOLUTE BASOPHILS: 48
ABSOLUTE EOSINOPHILS: 41
ABSOLUTE LYMPHOCYTES: 1532
ABSOLUTE MONOCYTES: 580
ABSOLUTE NEUTROPHILS: 4699
ALBUMIN: 4.3
ALKALINE PHOSPHATASE: 77
ALT (SGPT): 19
AST (SGOT): 32
BASOPHILS: 0.7
BILIRUBIN, TOTAL: 0.4
BUN/CREATININE RATIO: 17
BUN: 33
C-REACTIVE PROTEIN, QUANT: 3.4
CALCIUM: 9.3
CARBON DIOXIDE, TOTAL: 25
CHLORIDE: 105
CREATININE: 1.98
EGFR: 36
EOSINOPHILS: 0.6
GLOBULIN, TOTAL: 2.5
GLUCOSE: 100
HEMATOCRIT: 29.8
HEMOGLOBIN: 8.7
IRON BIND.CAP.(TIBC): 449
IRON SATURATION: 5
IRON: 22
LYMPHOCYTES: 22.2
MCH: 23.6
MCHC: 29.2
MCV: 80.8
MONOCYTES: 8.4
MPV: 9.8
NEUTROPHILS: 68.1
PLATELET COUNT: 319
POTASSIUM: 4.4
PROTEIN, TOTAL: 6.8
RDW: 16.3
RED BLOOD CELL COUNT: 3.69
SED RATE BY MODIFIED: 9
SODIUM: 140
WHITE BLOOD CELL COUNT: 6.9

## 2025-05-22 ENCOUNTER — TELEPHONE ENCOUNTER (OUTPATIENT)
Dept: URBAN - NONMETROPOLITAN AREA CLINIC 2 | Facility: CLINIC | Age: 65
End: 2025-05-22

## 2025-05-22 PROBLEM — 87522002: Status: ACTIVE | Noted: 2025-05-22

## 2025-06-17 ENCOUNTER — OFFICE VISIT (OUTPATIENT)
Dept: URBAN - NONMETROPOLITAN AREA CLINIC 2 | Facility: CLINIC | Age: 65
End: 2025-06-17
Payer: COMMERCIAL

## 2025-06-17 DIAGNOSIS — K27.9 PUD (PEPTIC ULCER DISEASE): ICD-10-CM

## 2025-06-17 DIAGNOSIS — Z86.79 HISTORY OF CAD (CORONARY ARTERY DISEASE): ICD-10-CM

## 2025-06-17 DIAGNOSIS — R11.0 CHRONIC NAUSEA: ICD-10-CM

## 2025-06-17 DIAGNOSIS — D84.9 IMMUNOSUPPRESSED STATUS: ICD-10-CM

## 2025-06-17 DIAGNOSIS — K22.70 BARRETT'S ESOPHAGUS: ICD-10-CM

## 2025-06-17 DIAGNOSIS — K62.5 RECTAL BLEEDING: ICD-10-CM

## 2025-06-17 DIAGNOSIS — R19.8 RECTAL TENESMUS: ICD-10-CM

## 2025-06-17 DIAGNOSIS — K51.90 ULCERATIVE COLITIS: ICD-10-CM

## 2025-06-17 DIAGNOSIS — R13.10 DYSPHAGIA: ICD-10-CM

## 2025-06-17 DIAGNOSIS — R10.32 LLQ PAIN: ICD-10-CM

## 2025-06-17 DIAGNOSIS — R63.4 WEIGHT LOSS: ICD-10-CM

## 2025-06-17 DIAGNOSIS — R19.5 LOOSE STOOLS: ICD-10-CM

## 2025-06-17 DIAGNOSIS — D64.9 ANEMIA, UNSPECIFIED TYPE: ICD-10-CM

## 2025-06-17 DIAGNOSIS — K59.09 CHRONIC CONSTIPATION: ICD-10-CM

## 2025-06-17 PROCEDURE — 99214 OFFICE O/P EST MOD 30 MIN: CPT

## 2025-06-17 RX ORDER — DICYCLOMINE HYDROCHLORIDE 20 MG/1
1 TABLET TABLET ORAL THREE TIMES A DAY
Qty: 270 TABLET | Refills: 3 | Status: ACTIVE | COMMUNITY

## 2025-06-17 RX ORDER — ADALIMUMAB 40MG/0.4ML
AS DIRECTED START DAY 28 KIT SUBCUTANEOUS EVERY OTHER WEEK
Qty: 2 PRE-FILLED PEN SYRINGE | Refills: 6 | Status: ACTIVE | COMMUNITY
Start: 2025-05-20 | End: 2025-12-02

## 2025-06-17 RX ORDER — PANTOPRAZOLE SODIUM 40 MG/1
1 TABLET TABLET, DELAYED RELEASE ORAL TWICE A DAY
Qty: 180 TABLET | Refills: 3 | OUTPATIENT
Start: 2025-06-17

## 2025-06-17 RX ORDER — PREDNISONE 10 MG/1
TAKE 4 TABS PO X 1 WEEK, TAKE 3 TABS PO X 1 WEEK, TAKE 2 TABS PO X 1 WEEK, TAKE 1 TAB PO X 1 WEEK TABLET ORAL ONCE A DAY
Qty: 70 | Refills: 0 | OUTPATIENT
Start: 2025-06-17

## 2025-06-17 RX ORDER — ONDANSETRON 4 MG/1
1 TABLET ON THE TONGUE AND ALLOW TO DISSOLVE TABLET, ORALLY DISINTEGRATING ORAL
Qty: 30 TABLET | Refills: 0 | Status: ACTIVE | COMMUNITY
Start: 2024-10-23

## 2025-06-17 RX ORDER — ADALIMUMAB 80MG/0.8ML
STARTER KIT. 160MG DAY 1 AND 80MG DAY 14 KIT SUBCUTANEOUS EVERY 2 WEEKS
Qty: 3 PRE-FILLED PEN SYRINGE | Refills: 0 | Status: ACTIVE | COMMUNITY
Start: 2025-05-20 | End: 2025-06-17

## 2025-06-17 RX ORDER — BUDESONIDE 3 MG/1
3 CAPSULES CAPSULE, DELAYED RELEASE PELLETS ORAL ONCE A DAY
Qty: 168 CAPSULE | Refills: 0 | Status: ACTIVE | COMMUNITY
Start: 2025-05-20

## 2025-06-17 RX ORDER — MESALAMINE 1.2 G/1
TABLET, DELAYED RELEASE ORAL
Qty: 0 | Refills: 0 | Status: ACTIVE | COMMUNITY
Start: 1900-01-01

## 2025-06-17 RX ORDER — PANTOPRAZOLE SODIUM 40 MG/1
1 TABLET TABLET, DELAYED RELEASE ORAL TWICE A DAY
Qty: 180 TABLET | Refills: 3 | Status: ACTIVE | COMMUNITY
Start: 2025-05-20

## 2025-06-17 RX ORDER — ADALIMUMAB 40MG/0.4ML
40MG EVERY 2 WEEKS KIT SUBCUTANEOUS EVERY 2 WEEKS
Qty: 2 | Refills: 11 | Status: ON HOLD | COMMUNITY
Start: 2024-10-23 | End: 2025-09-24

## 2025-06-17 RX ORDER — LISINOPRIL 30 MG/1
TABLET ORAL
Qty: 100 TABLET | Status: ACTIVE | COMMUNITY

## 2025-06-17 NOTE — HPI-OTHER HISTORIES
65 y/o M with history of left sided ulcerative colitis and Bagley's esophagus with low grade dysplasia presents to re-establish care. He last had EGD/colonoscopy with our group 10/2019 with long segment Bagley's and active proctosigmoiditis. He had a Bagley's ablation a month later with Dr. Harrington after his biopsies showed low grade dysplasia.   6/25/2024 Domingo presents to clinic for evaluation of rectal bleeding lower abdominal pain and frequent bowel movements. He also has a history of Bagley's esophagus with ablation in the past with Dr. Harrington. Today he complains of ongoing bloody stool which occurs every day for the first hour of the day after he wakes up. He has been off Lialda he states this has been ongoing for years. For some time. He denies any reflux symptoms. He continues on meloxicam and pantoprazole 40 mg once a day. He no longer has sleep apnea states this resolved after significant weight loss. He lost 40 pounds over the last year and a half unintentionally. He is having 6-10 trips to the bathroom per day left lower abdomen cramping discomfort and a sense of rectal pressure. He is also having some rectal leakage. This has been ongoing for years. He endorses some dysphagia due to difficulty chewing well with his upper denture. He refuses to use Uceris rectal foam but will consider suppositories. He would like to schedule his repeat surveillance EGD and colonoscopy. However today he is experiencing chest pain and is overdue for his follow-up with cardiology with a history of CAD with PCI. He believes he is due for a 5-year stress test. He has not yet established with a cardiologist in this area. He will call [facility] to seek an appointment and we will send a clearance request.  10/23/2024: Mr. Domingo West is a 64-year-old male who presents today for follow-up of panendoscopy. EGD was performed showing Bagley's esophagus and a healed focal erosion. 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 Today Domingo endorses constant nausea though the indigestion and upper abdominal pain he was experiencing at his last office visit resolved following EGD. He denies any melena or hematemesis. He does endorse persistent bright red blood when wiping. He has not taken Liotta since July of this year. He admits that it is hard for him to take pills on a daily basis and would prefer an infusion or injection to manage his UC. He is also struggling with some constipation recently, having a bowel movement every 3 to 4 days followed by diarrhea. We discussed the possibility of overlapping IBS-C and may start him on lactulose versus Amitiza at follow-up pending his improvement with Humira., He continues to use dicyclomine 20 mg twice daily for abdominal discomfort and we may also look this to Levsin. For now, plan to start Humira and continue pantoprazole. LG.  5/20/2025: Domingo presents today for an ulcerative colitis follow-up. Since his last visit, he did not start his Humira induction injections. He states that he is having bowel movements every other day currently. He does report that he alternates between constipation and diarrhea every few months. He is noticing bright red blood and mucus in most of his bowel movements. He does also admit to a 30 pound weight loss in the last year. He also takes dicyclomine 20 mg 3 times daily for abdominal cramping and reports that it works well. His last colonoscopy was 8/2024 that revealed moderate inflammation in the rectum with biopsies consistent with ulcerative colitis. He is due for repeat in 2027. Upon reviewing his last visit labs, he had a hemoglobin of 10.3. He denies hematemesis, melena. He does have a history of Bagley's esophagus. His last EGD was 8/2024 that revealed 5 cm segment of focal and intestinal metaplasia without dysplasia. He is due for repeat in 2026. He is currently taking pantoprazole 40 mg once a day. He denies dysphagia, heartburn, hematemesis. Will repeat labs today to check his anemia. If still low, refer to heme. Anemia likely secondary to uncontrolled UC. Will also restart Humira injection induction. Will also increase pantoprazole 40 mg dose to twice a day to cover for Bagley's esophagus. Close follow-up in 4 to 6 weeks. HJ  6/17/2025 Reji presents today for follow-up on his ulcerative colitis. Since his last visit, he has been following with [facility] for his anemia. He has received an iron infusion in the last couple weeks. He does have a follow-up appointment with [facility] in August. He did not start Humira as prescribed at his last visit due to cost. He is currently not on any maintenance medication for his ulcerative colitis. He has been taking the budesonide 9 mg daily for the last 8 weeks. He states that his bowel movements alternate between diarrhea and constipation. He still reports occasional bright red bleeding per rectum as well as abdominal pain. His weight did drop to about 193lbs after seeing him last but he is now back up 10-15 pounds. Lengthy discussion with patient regarding his ulcerative colitis treatment. Will start Remicade infusions and discussed transition to injections at follow-up visit. Continue to follow with [facility] regarding anemia. Will initiate prednisone taper for short course to help transition him to his infusion. Will follow-up in the next 3 months for LFTs recheck and discussion on transition to subcu maintenance dosing. HJ

## 2025-07-07 ENCOUNTER — OFFICE VISIT (OUTPATIENT)
Dept: URBAN - NONMETROPOLITAN AREA CLINIC 1 | Facility: CLINIC | Age: 65
End: 2025-07-07

## 2025-07-14 ENCOUNTER — OFFICE VISIT (OUTPATIENT)
Dept: URBAN - NONMETROPOLITAN AREA CLINIC 1 | Facility: CLINIC | Age: 65
End: 2025-07-14
Payer: COMMERCIAL

## 2025-07-14 ENCOUNTER — TELEPHONE ENCOUNTER (OUTPATIENT)
Dept: URBAN - NONMETROPOLITAN AREA CLINIC 1 | Facility: CLINIC | Age: 65
End: 2025-07-14

## 2025-07-14 DIAGNOSIS — K51.319 ULCERATIVE RECTOSIGMOIDITIS WITH COMPLICATION: ICD-10-CM

## 2025-07-14 PROCEDURE — 96415 CHEMO IV INFUSION ADDL HR: CPT | Performed by: INTERNAL MEDICINE

## 2025-07-14 PROCEDURE — 96413 CHEMO IV INFUSION 1 HR: CPT | Performed by: INTERNAL MEDICINE

## 2025-07-14 RX ORDER — ONDANSETRON 4 MG/1
1 TABLET ON THE TONGUE AND ALLOW TO DISSOLVE TABLET, ORALLY DISINTEGRATING ORAL
Qty: 30 TABLET | Refills: 0 | Status: ACTIVE | COMMUNITY
Start: 2024-10-23

## 2025-07-14 RX ORDER — LISINOPRIL 30 MG/1
TABLET ORAL
Qty: 100 TABLET | Status: ACTIVE | COMMUNITY

## 2025-07-14 RX ORDER — BUDESONIDE 3 MG/1
3 CAPSULES CAPSULE, DELAYED RELEASE PELLETS ORAL ONCE A DAY
Qty: 168 CAPSULE | Refills: 0 | Status: ACTIVE | COMMUNITY
Start: 2025-05-20

## 2025-07-14 RX ORDER — DICYCLOMINE HYDROCHLORIDE 20 MG/1
1 TABLET TABLET ORAL THREE TIMES A DAY
Qty: 270 TABLET | Refills: 3 | Status: ACTIVE | COMMUNITY

## 2025-07-14 RX ORDER — PANTOPRAZOLE SODIUM 40 MG/1
1 TABLET TABLET, DELAYED RELEASE ORAL TWICE A DAY
Qty: 180 TABLET | Refills: 3 | Status: ACTIVE | COMMUNITY
Start: 2025-06-17

## 2025-07-14 RX ORDER — PREDNISONE 10 MG/1
TAKE 4 TABS PO X 1 WEEK, TAKE 3 TABS PO X 1 WEEK, TAKE 2 TABS PO X 1 WEEK, TAKE 1 TAB PO X 1 WEEK TABLET ORAL ONCE A DAY
Qty: 70 | Refills: 0 | Status: ACTIVE | COMMUNITY
Start: 2025-06-17

## 2025-07-14 RX ORDER — MESALAMINE 1.2 G/1
TABLET, DELAYED RELEASE ORAL
Qty: 0 | Refills: 0 | Status: ACTIVE | COMMUNITY
Start: 1900-01-01

## 2025-07-14 RX ORDER — ADALIMUMAB 40MG/0.4ML
AS DIRECTED START DAY 28 KIT SUBCUTANEOUS EVERY OTHER WEEK
Qty: 2 PRE-FILLED PEN SYRINGE | Refills: 6 | Status: ACTIVE | COMMUNITY
Start: 2025-05-20 | End: 2025-12-02

## 2025-07-14 RX ORDER — ADALIMUMAB 40MG/0.4ML
40MG EVERY 2 WEEKS KIT SUBCUTANEOUS EVERY 2 WEEKS
Qty: 2 | Refills: 11 | Status: ON HOLD | COMMUNITY
Start: 2024-10-23 | End: 2025-09-24

## 2025-07-17 ENCOUNTER — TELEPHONE ENCOUNTER (OUTPATIENT)
Dept: URBAN - NONMETROPOLITAN AREA CLINIC 2 | Facility: CLINIC | Age: 65
End: 2025-07-17

## 2025-07-21 ENCOUNTER — OFFICE VISIT (OUTPATIENT)
Dept: URBAN - NONMETROPOLITAN AREA CLINIC 1 | Facility: CLINIC | Age: 65
End: 2025-07-21

## 2025-07-21 ENCOUNTER — TELEPHONE ENCOUNTER (OUTPATIENT)
Dept: URBAN - NONMETROPOLITAN AREA CLINIC 2 | Facility: CLINIC | Age: 65
End: 2025-07-21

## 2025-07-24 ENCOUNTER — TELEPHONE ENCOUNTER (OUTPATIENT)
Dept: URBAN - METROPOLITAN AREA CLINIC 23 | Facility: CLINIC | Age: 65
End: 2025-07-24

## 2025-07-28 ENCOUNTER — OFFICE VISIT (OUTPATIENT)
Dept: URBAN - NONMETROPOLITAN AREA CLINIC 1 | Facility: CLINIC | Age: 65
End: 2025-07-28

## 2025-07-28 ENCOUNTER — TELEPHONE ENCOUNTER (OUTPATIENT)
Dept: URBAN - NONMETROPOLITAN AREA CLINIC 2 | Facility: CLINIC | Age: 65
End: 2025-07-28

## 2025-08-06 ENCOUNTER — ERX REFILL RESPONSE (OUTPATIENT)
Dept: URBAN - NONMETROPOLITAN AREA CLINIC 2 | Facility: CLINIC | Age: 65
End: 2025-08-06

## 2025-08-06 RX ORDER — BUDESONIDE 3 MG/1
TAKE THREE CAPSULES BY MOUTH EVERY DAY CAPSULE, GELATIN COATED ORAL
Qty: 168 CAPSULE | Refills: 0

## 2025-08-18 ENCOUNTER — OFFICE VISIT (OUTPATIENT)
Dept: URBAN - NONMETROPOLITAN AREA CLINIC 1 | Facility: CLINIC | Age: 65
End: 2025-08-18

## 2025-08-22 ENCOUNTER — OFFICE VISIT (OUTPATIENT)
Dept: URBAN - NONMETROPOLITAN AREA CLINIC 2 | Facility: CLINIC | Age: 65
End: 2025-08-22
Payer: COMMERCIAL

## 2025-08-22 DIAGNOSIS — R11.0 CHRONIC NAUSEA: ICD-10-CM

## 2025-08-22 DIAGNOSIS — K51.319 ULCERATIVE RECTOSIGMOIDITIS WITH COMPLICATION: ICD-10-CM

## 2025-08-22 DIAGNOSIS — K59.09 CHRONIC CONSTIPATION: ICD-10-CM

## 2025-08-22 DIAGNOSIS — D50.9 IRON DEFICIENCY ANEMIA, UNSPECIFIED IRON DEFICIENCY ANEMIA TYPE: ICD-10-CM

## 2025-08-22 DIAGNOSIS — K62.5 RECTAL BLEEDING: ICD-10-CM

## 2025-08-22 DIAGNOSIS — K22.70 BARRETT'S ESOPHAGUS: ICD-10-CM

## 2025-08-22 DIAGNOSIS — R13.10 DYSPHAGIA: ICD-10-CM

## 2025-08-22 DIAGNOSIS — Z86.79 HISTORY OF CAD (CORONARY ARTERY DISEASE): ICD-10-CM

## 2025-08-22 DIAGNOSIS — K27.9 PUD (PEPTIC ULCER DISEASE): ICD-10-CM

## 2025-08-22 DIAGNOSIS — R10.32 LLQ PAIN: ICD-10-CM

## 2025-08-22 DIAGNOSIS — R19.8 RECTAL TENESMUS: ICD-10-CM

## 2025-08-22 DIAGNOSIS — D84.9 IMMUNOSUPPRESSED STATUS: ICD-10-CM

## 2025-08-22 PROCEDURE — 99214 OFFICE O/P EST MOD 30 MIN: CPT

## 2025-08-22 RX ORDER — LISINOPRIL 30 MG/1
TABLET ORAL
Qty: 100 TABLET | Status: ACTIVE | COMMUNITY

## 2025-08-22 RX ORDER — ADALIMUMAB 40MG/0.4ML
AS DIRECTED START DAY 28 KIT SUBCUTANEOUS EVERY OTHER WEEK
Qty: 2 PRE-FILLED PEN SYRINGE | Refills: 6 | Status: ACTIVE | COMMUNITY
Start: 2025-05-20 | End: 2025-12-02

## 2025-08-22 RX ORDER — ONDANSETRON 4 MG/1
1 TABLET ON THE TONGUE AND ALLOW TO DISSOLVE TABLET, ORALLY DISINTEGRATING ORAL
Qty: 30 TABLET | Refills: 0 | Status: ACTIVE | COMMUNITY
Start: 2024-10-23

## 2025-08-22 RX ORDER — MESALAMINE 1.2 G/1
TABLET, DELAYED RELEASE ORAL
Qty: 0 | Refills: 0 | Status: ACTIVE | COMMUNITY
Start: 1900-01-01

## 2025-08-22 RX ORDER — ADALIMUMAB 40MG/0.4ML
40MG EVERY 2 WEEKS KIT SUBCUTANEOUS EVERY 2 WEEKS
Qty: 2 | Refills: 11 | Status: ON HOLD | COMMUNITY
Start: 2024-10-23 | End: 2025-09-24

## 2025-08-22 RX ORDER — DICYCLOMINE HYDROCHLORIDE 20 MG/1
1 TABLET TABLET ORAL THREE TIMES A DAY
Qty: 270 TABLET | Refills: 3 | Status: ACTIVE | COMMUNITY

## 2025-08-22 RX ORDER — PANTOPRAZOLE SODIUM 40 MG/1
1 TABLET TABLET, DELAYED RELEASE ORAL TWICE A DAY
Qty: 180 TABLET | Refills: 3 | Status: ACTIVE | COMMUNITY
Start: 2025-06-17

## 2025-08-22 RX ORDER — PREDNISONE 10 MG/1
TAKE 4 TABS PO X 1 WEEK, TAKE 3 TABS PO X 1 WEEK, TAKE 2 TABS PO X 1 WEEK, TAKE 1 TAB PO X 1 WEEK TABLET ORAL ONCE A DAY
Qty: 70 | Refills: 0 | Status: ACTIVE | COMMUNITY
Start: 2025-06-17

## 2025-08-22 RX ORDER — RISANKIZUMAB-RZAA 60 MG/ML
AS DIRECTED INJECTION INTRAVENOUS
Qty: 1200 | Refills: 0 | OUTPATIENT
Start: 2025-08-22 | End: 2025-08-22

## 2025-08-22 RX ORDER — BUDESONIDE 3 MG/1
TAKE THREE CAPSULES BY MOUTH EVERY DAY CAPSULE, GELATIN COATED ORAL
Qty: 168 CAPSULE | Refills: 0 | Status: ACTIVE | COMMUNITY

## 2025-08-25 ENCOUNTER — OFFICE VISIT (OUTPATIENT)
Dept: URBAN - NONMETROPOLITAN AREA CLINIC 1 | Facility: CLINIC | Age: 65
End: 2025-08-25

## 2025-08-25 LAB
A/G RATIO: 1.8
ABSOLUTE BASOPHILS: 48
ABSOLUTE EOSINOPHILS: 8
ABSOLUTE LYMPHOCYTES: 1904
ABSOLUTE MONOCYTES: 776
ABSOLUTE NEUTROPHILS: 5264
ALBUMIN: 4.3
ALKALINE PHOSPHATASE: 73
ALT (SGPT): 14
AST (SGOT): 18
BASOPHILS: 0.6
BILIRUBIN, TOTAL: 0.4
BUN/CREATININE RATIO: (no result)
BUN: 20
C-REACTIVE PROTEIN, QUANT: 41.2
CALCIUM: 9.6
CARBON DIOXIDE, TOTAL: 31
CHLORIDE: 103
CREATININE: 1.17
EGFR: 68
EOSINOPHILS: 0.1
GLOBULIN, TOTAL: 2.4
GLUCOSE: 110
HEMATOCRIT: 40
HEMOGLOBIN: 13.1
IRON BIND.CAP.(TIBC): 270
IRON SATURATION: 11
IRON: 30
LYMPHOCYTES: 23.8
MCH: 28.9
MCHC: 32.8
MCV: 88.3
MONOCYTES: 9.7
MPV: 9.8
NEUTROPHILS: 65.8
PLATELET COUNT: 275
POTASSIUM: 5.6
PROTEIN, TOTAL: 6.7
RDW: 16.1
RED BLOOD CELL COUNT: 4.53
SED RATE BY MODIFIED: 11
SODIUM: 139
WHITE BLOOD CELL COUNT: 8

## 2025-08-27 ENCOUNTER — TELEPHONE ENCOUNTER (OUTPATIENT)
Dept: URBAN - NONMETROPOLITAN AREA CLINIC 2 | Facility: CLINIC | Age: 65
End: 2025-08-27

## 2025-08-27 PROBLEM — 236069009: Status: ACTIVE | Noted: 2025-08-22

## 2025-08-27 RX ORDER — BUDESONIDE 3 MG/1
3 CAPSULES CAPSULE, DELAYED RELEASE PELLETS ORAL ONCE A DAY
Qty: 168 CAPSULE | Refills: 0 | OUTPATIENT
Start: 2025-08-28